# Patient Record
Sex: MALE | Race: BLACK OR AFRICAN AMERICAN | NOT HISPANIC OR LATINO | URBAN - METROPOLITAN AREA
[De-identification: names, ages, dates, MRNs, and addresses within clinical notes are randomized per-mention and may not be internally consistent; named-entity substitution may affect disease eponyms.]

---

## 2020-11-02 ENCOUNTER — OFFICE VISIT - HEALTHEAST (OUTPATIENT)
Dept: FAMILY MEDICINE | Facility: CLINIC | Age: 50
End: 2020-11-02

## 2020-11-02 DIAGNOSIS — J30.2 SEASONAL ALLERGIC RHINITIS, UNSPECIFIED TRIGGER: ICD-10-CM

## 2020-11-02 RX ORDER — LORATADINE 10 MG/1
10 TABLET ORAL DAILY
Qty: 30 TABLET | Refills: 0 | Status: SHIPPED | OUTPATIENT
Start: 2020-11-02

## 2020-11-02 RX ORDER — ALBUTEROL SULFATE 90 UG/1
2 AEROSOL, METERED RESPIRATORY (INHALATION) EVERY 6 HOURS PRN
Qty: 1 EACH | Refills: 0 | Status: SHIPPED | OUTPATIENT
Start: 2020-11-02

## 2021-06-12 NOTE — PROGRESS NOTES
"Davon Chaparro is a 50 y.o. male who is being evaluated via a billable telephone visit.      The patient has been notified of following:     \"This telephone visit will be conducted via a call between you and your physician/provider. We have found that certain health care needs can be provided without the need for a physical exam.  This service lets us provide the care you need with a short phone conversation.  If a prescription is necessary we can send it directly to your pharmacy.  If lab work is needed we can place an order for that and you can then stop by our lab to have the test done at a later time.    Telephone visits are billed at different rates depending on your insurance coverage. During this emergency period, for some insurers they may be billed the same as an in-person visit.  Please reach out to your insurance provider with any questions.    If during the course of the call the physician/provider feels a telephone visit is not appropriate, you will not be charged for this service.\"    Patient has given verbal consent to a Telephone visit? Yes        Patient would like to receive their AVS by AVS Preference: Mail a copy.    Additional provider notes:   Davon Chaparro 50 y.o. male presented for telephone encounter.    Chief Complaint   Patient presents with     itchy eyes     x 8 days     Nasal Congestion     x 8 days        There is no problem list on file for this patient.     No current outpatient medications on file prior to visit.     No current facility-administered medications on file prior to visit.       No past surgical history on file.  No family history on file.    S:    Itchy eyes, stuffy nose, congestion, cough at night x 8 days  Allergies but not taking anything  No fever  No sore throat  No close contact with COVID19. Had negative COVID19  Saw pulmonologist few years ago and \"he put me on a bunch of medications\".  Meds ran out  Wants refill of Ventolin, loratadine 10mg, and promethazine " with codeine (states these were given to him by pulmonologist years ago)     O:  Patient sound alert, coherent, and not in distress      Assessment/Plan:  1. Seasonal allergic rhinitis, unspecified trigger  Will give him albuterol and loratadine  Will NOT give him promethazine with codeine  I informed him that my practice is closed to new patients  Advised that he follows with PCP in 2-4wks  - albuterol (PROAIR HFA;PROVENTIL HFA;VENTOLIN HFA) 90 mcg/actuation inhaler; Inhale 2 puffs every 6 (six) hours as needed for wheezing.  Dispense: 1 each; Refill: 0  - loratadine (CLARITIN) 10 mg tablet; Take 1 tablet (10 mg total) by mouth daily.  Dispense: 30 tablet; Refill: 0        Phone call duration:  11 minutes    Ayaka De Dios MD